# Patient Record
(demographics unavailable — no encounter records)

---

## 2025-05-14 NOTE — ASSESSMENT
[FreeTextEntry1] : Ms. JANNA DIXON is a 64 y/o F with left breast cancer dx in 1/2018 (T1c N0, RS 6), s/p lumpectomy and RT, and has been on treatment with Anastrozole since 6/2018. Patient will complete 7 years of treatment in June 2025.   1. Breast Cancer- Ms. JANNA DIXON is tolerating AI well, good compliance. She has mild side effects from the treatment. Discussed 5 yrs or 7 yrs of AI. She will prefer 7 yrs for T1c disease (to finish 6/2025). Annual breast imaging reviewed. SHE WILL COMPLETE AI END OF THIS MONTH 2. Osteoporosis: Concern for worsening bone density and fractures due to Anastrozole. Recommended to continue Prolia Q6 months, Calcium and Vitamin D. DEXA 1-2 yrs. Followed by Endocrinology at [facility]; Prolia next due 6/2025. SHE WILL REQUEST NEW DEXA FROM ENDO 3. High cholesterol/CAD risk factors: Concern for worsening cholesterol/CAD risk factors due to anastrozole. Patient is on Lipitor. Lipid profile annually. Lifestyle modifications d/w her. Discussed diet and exercise techniques for weight loss. 4. Discussed with patient that exercise and lifestyle modifications can result in improvement of fatigue, anxiety, sleep disturbance, depression and ultimately overall survival. Based on the current literature, an effective exercise prescription that most consistently addresses health-related outcomes experienced due to cancer diagnosis and cancer treatment includes moderate intensity aerobic training at least 3 times per week, for at least 30 minutes for at least 8-12 weeks. The addition of resistance training to aerobic training, at least 2 times per week, using at least 2 sets of 8-15 repetitions at least 60% of one repetition maximum, appears to result in similar benefits (Suresh et all 2019, [employer] College of Sports Medicine).  12/2022: CT CAP showed liver nodules MRI F/u 1/2023 - benign, 6 m f/u needed MRI 8/2023 - stable benign liver nodules  TRANSITION TO SURVIVORSHIP

## 2025-05-14 NOTE — ASSESSMENT
[FreeTextEntry1] : Ms. JANNA DIXON is a 66 y/o F with left breast cancer dx in 1/2018 (T1c N0, RS 6), s/p lumpectomy and RT, and has been on treatment with Anastrozole since 6/2018. Patient will complete 7 years of treatment in June 2025.   1. Breast Cancer- Ms. JANNA DIXON is tolerating AI well, good compliance. She has mild side effects from the treatment. Discussed 5 yrs or 7 yrs of AI. She will prefer 7 yrs for T1c disease (to finish 6/2025). Annual breast imaging reviewed. SHE WILL COMPLETE AI END OF THIS MONTH 2. Osteoporosis: Concern for worsening bone density and fractures due to Anastrozole. Recommended to continue Prolia Q6 months, Calcium and Vitamin D. DEXA 1-2 yrs. Followed by Endocrinology at [facility]; Prolia next due 6/2025. SHE WILL REQUEST NEW DEXA FROM ENDO 3. High cholesterol/CAD risk factors: Concern for worsening cholesterol/CAD risk factors due to anastrozole. Patient is on Lipitor. Lipid profile annually. Lifestyle modifications d/w her. Discussed diet and exercise techniques for weight loss. 4. Discussed with patient that exercise and lifestyle modifications can result in improvement of fatigue, anxiety, sleep disturbance, depression and ultimately overall survival. Based on the current literature, an effective exercise prescription that most consistently addresses health-related outcomes experienced due to cancer diagnosis and cancer treatment includes moderate intensity aerobic training at least 3 times per week, for at least 30 minutes for at least 8-12 weeks. The addition of resistance training to aerobic training, at least 2 times per week, using at least 2 sets of 8-15 repetitions at least 60% of one repetition maximum, appears to result in similar benefits (Suresh et all 2019, American College of Sports Medicine).  12/2022: CT CAP showed liver nodules MRI F/u 1/2023 - benign, 6 m f/u needed MRI 8/2023 - stable benign liver nodules  TRANSITION TO SURVIVORSHIP

## 2025-05-14 NOTE — HISTORY OF PRESENT ILLNESS
[de-identified] : The patient presented in 2018, at age 58, with an abnormal screening breast ultrasound.  The patient had screening mammography on 1/25/2018 which demonstrated heterogeneously dense breasts and was negative. However, ultrasound on the same day demonstrated a 5 mm mass in the left breast at the 8:00 position. Targeted ultrasound on 2/1/2118 confirmed the presence of the mass. Ultrasound-guided core biopsy was performed on 2/5/21018 and was positive for moderately differentiated infiltrating ductal carcinoma with focal micropapillary features measuring at least 0.5 cm. The cancer was ER positive (%), WI positive (%), HER-2/yuliana negative (1+) and Ki-67 15%.  Breast MRI performed on 2/12/2018 demonstrated an 11 mm nodule in the left breast and was otherwise negative.  Dr. Susan Palleschi performed a lumpectomy and sentinel lymph node biopsy on 3/7/2018 which demonstrated an 11 mm infiltrating ductal carcinoma, Clive score 6/9.  There were 4 negative sentinel lymph nodes. Surgical margins were negative. Oncotype DX recurrence score was 6.  The patient received radiation therapy under the supervision of Dr Chantal Baird which was completed on 5/17/2018.  She initiated anastrozole on approximately 6/1/2018.   Myriad [place] panel was negative on 2/12/2018.   5/2024: She was planned for open heart surgery for porcine valve replacement (18 yrs old), but is holding off as long as possible. She has some dyspnea on exertion, but still walks, up to 5 miles per day. No swelling of her legs. No N/V/D. Good compliance with anastrozole. No significant hot flashes, vaginal dryness, joint pains, GI side effects. Discussed 5 yrs or 7 yrs of AI at least visit. She will prefer 7 yrs for T1c disease, so she will remain until mid-2025 Mammogram at [facility] with Dr Palleschi 3/2023. 4/2024 BiRADS2  DEXA- 1/2020 osteoporosis, improved since 2018. She is on Prolia at [facility] for next month. She takes ca+vit D. DEXA due next year  12/2022: CT CAP (done prior to planned surgery out of concern for cancer recurrence) showed liver nodules MRI Abd 8/2023, benign liver nodules, stable from MRI 1/2023 [de-identified] : Infiltrating ductal carcinoma, SBR 6/9, ER+ [place]+ Her 2 yuliana - Oncotype DX 6 [FreeTextEntry1] : Anastrozole start approximately 6/1/2018.\par  Prolia start 10/29/2018 [de-identified] : Ms. JANNA DAILEY is here for a follow up appt for left breast cancer dx in 1/2018 (T1c N0, RS 6), and is on treatment with Anastrozole since 6/2018. First visit 3/2021.  5/14/2025: Patient continues to take Anastrozole with good daily efficacy. Patient denies any excessive side effects consistent with: arthralgias, hot flashes, vaginal dryness, hair thinning, GI s/e's, SOB, excessive fatigue and sleep or mood disturbances.  Patient to discontinue use in June 2025 (6/2018 - 6/2025). Breast Health: Last Digital Screening Mammogram and Bilateral Complete Breast US completed on 4/04/2024 at [facility]; BI-RADS 2. Imaging ordered and managed by Dr. Palleschi. Our office called [facility], no current imaging for the 2025 year. Patient reports she is scheduled to undergo imaging at the end of May. Patient is UTD with Breast Surgery; last seen on 3 September 2024.  DEXA- 1/2020 osteoporosis, improved since 2018. She is on Prolia at [facility], Q6 months. Office contacted [facility]. No recent imaging (last 2 years) available. Patient denies any issues or concerns with her dentition. UTD with dentistry.  Ms. Dailey established care with a new GI MD on 5/7/2025, Dr. Bailee Saucead. Patient reports she developed some rectal bleeding (bright red blood) r/t Coumadin use and a history of hemorrhoids. The patient reports a history of undergoing colonoscopies every 5 years for a history of colorectal cancer in her father. The patient has been scheduled to under a Colonoscopy on 19 June 2025. s/p open heart surgery, now on coumadin  RTC: 6 Months

## 2025-05-14 NOTE — HISTORY OF PRESENT ILLNESS
[de-identified] : The patient presented in 2018, at age 58, with an abnormal screening breast ultrasound.  The patient had screening mammography on 1/25/2018 which demonstrated heterogeneously dense breasts and was negative. However, ultrasound on the same day demonstrated a 5 mm mass in the left breast at the 8:00 position. Targeted ultrasound on 2/1/2118 confirmed the presence of the mass. Ultrasound-guided core biopsy was performed on 2/5/21018 and was positive for moderately differentiated infiltrating ductal carcinoma with focal micropapillary features measuring at least 0.5 cm. The cancer was ER positive (%), DE positive (%), HER-2/yuliana negative (1+) and Ki-67 15%.  Breast MRI performed on 2/12/2018 demonstrated an 11 mm nodule in the left breast and was otherwise negative.  Dr. Susan Palleschi performed a lumpectomy and sentinel lymph node biopsy on 3/7/2018 which demonstrated an 11 mm infiltrating ductal carcinoma, [place] score 6/9.  There were 4 negative sentinel lymph nodes. Surgical margins were negative. Oncotype DX recurrence score was 6.  The patient received radiation therapy under the supervision of Dr Chantal Baird which was completed on 5/17/2018.  She initiated anastrozole on approximately 6/1/2018.   Myriad abeosk panel was negative on 2/12/2018.   5/2024: She was planned for open heart surgery for porcine valve replacement (18 yrs old), but is holding off as long as possible. She has some dyspnea on exertion, but still walks, up to 5 miles per day. No swelling of her legs. No N/V/D. Good compliance with anastrozole. No significant hot flashes, vaginal dryness, joint pains, GI side effects. Discussed 5 yrs or 7 yrs of AI at least visit. She will prefer 7 yrs for T1c disease, so she will remain until mid-2025 Mammogram at [facility] with Dr Palleschi 3/2023. 4/2024 BiRADS2  DEXA- 1/2020 osteoporosis, improved since 2018. She is on Prolia at [facility] for next month. She takes ca+vit D. DEXA due next year  12/2022: CT CAP (done prior to planned surgery out of concern for cancer recurrence) showed liver nodules MRI Abd 8/2023, benign liver nodules, stable from MRI 1/2023 [de-identified] : Infiltrating ductal carcinoma, SBR 6/9, ER+ [place]+ Her 2 yuliana - Oncotype DX 6 [FreeTextEntry1] : Anastrozole start approximately 6/1/2018.\par  Prolia start 10/29/2018 [de-identified] : Ms. JANNA DAILEY is here for a follow up appt for left breast cancer dx in 1/2018 (T1c N0, RS 6), and is on treatment with Anastrozole since 6/2018. First visit 3/2021.  5/14/2025: Patient continues to take Anastrozole with good daily efficacy. Patient denies any excessive side effects consistent with: arthralgias, hot flashes, vaginal dryness, hair thinning, GI s/e's, SOB, excessive fatigue and sleep or mood disturbances.  Patient to discontinue use in June 2025 (6/2018 - 6/2025). Breast Health: Last Digital Screening Mammogram and Bilateral Complete Breast US completed on 4/04/2024 at [facility]; BI-RADS 2. Imaging ordered and managed by Dr. Palleschi. Our office called [facility], no current imaging for the 2025 year. Patient reports she is scheduled to undergo imaging at the end of May. Patient is UTD with Breast Surgery; last seen on 3 September 2024.  DEXA- 1/2020 osteoporosis, improved since 2018. She is on Prolia at [facility], Q6 months. Office contacted [facility]. No recent imaging (last 2 years) available. Patient denies any issues or concerns with her dentition. UTD with dentistry.  Ms. Dailey established care with a new GI MD on 5/7/2025, Dr. Bailee Sauceda. Patient reports she developed some rectal bleeding (bright red blood) r/t Coumadin use and a history of hemorrhoids. The patient reports a history of undergoing colonoscopies every 5 years for a history of colorectal cancer in her father. The patient has been scheduled to under a Colonoscopy on 19 June 2025. s/p open heart surgery, now on coumadin  RTC: 6 Months